# Patient Record
Sex: MALE | Race: WHITE | Employment: UNEMPLOYED | URBAN - METROPOLITAN AREA
[De-identification: names, ages, dates, MRNs, and addresses within clinical notes are randomized per-mention and may not be internally consistent; named-entity substitution may affect disease eponyms.]

---

## 2019-03-08 ENCOUNTER — OFFICE VISIT (OUTPATIENT)
Dept: CARDIOLOGY CLINIC | Age: 69
End: 2019-03-08
Payer: MEDICARE

## 2019-03-08 VITALS
HEART RATE: 69 BPM | BODY MASS INDEX: 29.48 KG/M2 | WEIGHT: 210.6 LBS | HEIGHT: 71 IN | SYSTOLIC BLOOD PRESSURE: 138 MMHG | DIASTOLIC BLOOD PRESSURE: 80 MMHG

## 2019-03-08 DIAGNOSIS — I25.10 CORONARY ARTERY CALCIFICATION SEEN ON CAT SCAN: ICD-10-CM

## 2019-03-08 DIAGNOSIS — I48.19 PERSISTENT ATRIAL FIBRILLATION (HCC): Primary | ICD-10-CM

## 2019-03-08 DIAGNOSIS — Z82.49 FAMILY HISTORY OF HEART DISEASE: ICD-10-CM

## 2019-03-08 DIAGNOSIS — E78.00 ELEVATED LDL CHOLESTEROL LEVEL: ICD-10-CM

## 2019-03-08 PROCEDURE — 99999 PR OFFICE/OUTPT VISIT,PROCEDURE ONLY: CPT | Performed by: INTERNAL MEDICINE

## 2019-03-08 PROCEDURE — 1036F TOBACCO NON-USER: CPT | Performed by: INTERNAL MEDICINE

## 2019-03-08 PROCEDURE — 3017F COLORECTAL CA SCREEN DOC REV: CPT | Performed by: INTERNAL MEDICINE

## 2019-03-08 PROCEDURE — 93000 ELECTROCARDIOGRAM COMPLETE: CPT | Performed by: INTERNAL MEDICINE

## 2019-03-08 PROCEDURE — G8598 ASA/ANTIPLAT THER USED: HCPCS | Performed by: INTERNAL MEDICINE

## 2019-03-08 PROCEDURE — 1123F ACP DISCUSS/DSCN MKR DOCD: CPT | Performed by: INTERNAL MEDICINE

## 2019-03-08 PROCEDURE — G8427 DOCREV CUR MEDS BY ELIG CLIN: HCPCS | Performed by: INTERNAL MEDICINE

## 2019-03-08 PROCEDURE — G8419 CALC BMI OUT NRM PARAM NOF/U: HCPCS | Performed by: INTERNAL MEDICINE

## 2019-03-08 PROCEDURE — 4040F PNEUMOC VAC/ADMIN/RCVD: CPT | Performed by: INTERNAL MEDICINE

## 2019-03-08 PROCEDURE — 1101F PT FALLS ASSESS-DOCD LE1/YR: CPT | Performed by: INTERNAL MEDICINE

## 2019-03-08 PROCEDURE — G8484 FLU IMMUNIZE NO ADMIN: HCPCS | Performed by: INTERNAL MEDICINE

## 2019-03-08 RX ORDER — QUINIDINE GLUCONATE 324 MG
2 TABLET, EXTENDED RELEASE ORAL DAILY
COMMUNITY

## 2019-03-08 RX ORDER — CHLORAL HYDRATE 500 MG
1000 CAPSULE ORAL DAILY
COMMUNITY

## 2019-03-28 ENCOUNTER — HOSPITAL ENCOUNTER (OUTPATIENT)
Dept: NON INVASIVE DIAGNOSTICS | Age: 69
Discharge: HOME | End: 2019-03-28
Payer: MEDICARE

## 2019-03-28 DIAGNOSIS — I25.10 CORONARY ARTERY CALCIFICATION SEEN ON CAT SCAN: ICD-10-CM

## 2019-03-28 DIAGNOSIS — Z82.49 FAMILY HISTORY OF HEART DISEASE: ICD-10-CM

## 2019-03-28 LAB
LV EF: 50 %
LVEF MODALITY: NORMAL

## 2019-03-28 PROCEDURE — 93320 DOPPLER ECHO COMPLETE: CPT

## 2019-03-28 PROCEDURE — 93351 STRESS TTE COMPLETE: CPT

## 2019-04-17 ENCOUNTER — HOSPITAL ENCOUNTER (OUTPATIENT)
Dept: CARDIAC CATH/INVASIVE PROCEDURES | Age: 69
Discharge: HOME | End: 2019-04-17
Attending: INTERNAL MEDICINE | Admitting: INTERNAL MEDICINE
Payer: MEDICARE

## 2019-04-17 VITALS — HEIGHT: 71 IN | WEIGHT: 210 LBS | BODY MASS INDEX: 29.4 KG/M2

## 2019-04-17 PROBLEM — I48.19 PERSISTENT ATRIAL FIBRILLATION (HCC): Status: ACTIVE | Noted: 2019-04-17

## 2019-04-17 LAB
EKG ATRIAL RATE: 69 BPM
EKG DIAGNOSIS: NORMAL
EKG P AXIS: 63 DEGREES
EKG P-R INTERVAL: 190 MS
EKG Q-T INTERVAL: 390 MS
EKG QRS DURATION: 78 MS
EKG QTC CALCULATION (BAZETT): 417 MS
EKG R AXIS: 66 DEGREES
EKG T AXIS: 35 DEGREES
EKG VENTRICULAR RATE: 69 BPM
GFR AFRICAN AMERICAN: >60
GFR NON-AFRICAN AMERICAN: >60
GLUCOSE BLD-MCNC: 99 MG/DL (ref 70–99)
PERFORMED ON: ABNORMAL
POC BUN: 23 MG/DL (ref 7–18)
POC CREATININE: 0.9 MG/DL (ref 0.8–1.3)
POC HEMATOCRIT: 40 % (ref 40.5–52.5)
POC POTASSIUM: 4.8 MMOL/L (ref 3.5–5.1)
POC SAMPLE TYPE: ABNORMAL
POC SODIUM: 141 MMOL/L (ref 136–145)

## 2019-04-17 PROCEDURE — 2500000003 HC RX 250 WO HCPCS

## 2019-04-17 PROCEDURE — 84295 ASSAY OF SERUM SODIUM: CPT

## 2019-04-17 PROCEDURE — 82947 ASSAY GLUCOSE BLOOD QUANT: CPT

## 2019-04-17 PROCEDURE — 92960 CARDIOVERSION ELECTRIC EXT: CPT

## 2019-04-17 PROCEDURE — 84520 ASSAY OF UREA NITROGEN: CPT

## 2019-04-17 PROCEDURE — 93010 ELECTROCARDIOGRAM REPORT: CPT | Performed by: INTERNAL MEDICINE

## 2019-04-17 PROCEDURE — 85014 HEMATOCRIT: CPT

## 2019-04-17 PROCEDURE — 82565 ASSAY OF CREATININE: CPT

## 2019-04-17 PROCEDURE — 7100000010 HC PHASE II RECOVERY - FIRST 15 MIN

## 2019-04-17 PROCEDURE — 93005 ELECTROCARDIOGRAM TRACING: CPT | Performed by: INTERNAL MEDICINE

## 2019-04-17 PROCEDURE — 84132 ASSAY OF SERUM POTASSIUM: CPT

## 2019-04-17 PROCEDURE — 99999 PR OFFICE/OUTPT VISIT,PROCEDURE ONLY: CPT | Performed by: INTERNAL MEDICINE

## 2019-04-17 PROCEDURE — 92960 CARDIOVERSION ELECTRIC EXT: CPT | Performed by: INTERNAL MEDICINE

## 2019-04-17 RX ORDER — METOPROLOL SUCCINATE 25 MG/1
25 TABLET, EXTENDED RELEASE ORAL DAILY
Qty: 30 TABLET | Refills: 3 | Status: SHIPPED | OUTPATIENT
Start: 2019-04-17 | End: 2019-05-22 | Stop reason: SDUPTHER

## 2019-04-17 NOTE — PROCEDURES
[address]                     [address] [facility], [facility]                            CARDIAC CATHETERIZATION    PATIENT NAME: Mainor Weeks                          :        1950  MED REC NO:   [id number]                          ROOM:  ACCOUNT NO:   [de-identified]                           ADMIT DATE: 2019  PROVIDER:     Caity Moncada MD    DATE OF PROCEDURE:  2019    PROCEDURE PERFORMED:  Elective cardioversion. INDICATION FOR PROCEDURE:  The patient with new onset atrial  fibrillation, now was on Eliquis therapy. SURGEON:  Caity Moncada MD.    DESCRIPTION OF PROCEDURE:  After informed consent was obtained from the  patient, he was given 80 mg of intravenous Bromatol for full conscious  sedation. All vital signs were monitored including post oximeter. He  was then cardioverted using 200 joules to a normal sinus rhythm without  any apparent complications. He will be discharged on Eliquis 5 mg  b.i.d. He will continue and also will be on Toprol-XL 25 mg daily. We  will see him in follow up in one month's time. Also, since his atrial  fibrillation is relatively asymptomatic, we will plan on event recorder  at his follow up visit provided he maintains normal sinus rhythm that  will help us to decide if he should stay on Eliquis or not and also  consider other treatment options.         Tiny Fried MD    D: 2019 10:06:35       T: 2019 10:14:57     [email or web address]  Job#: [id number]     Doc#: [id number]    CC:

## 2019-04-17 NOTE — H&P
Yes Skyla Cool MD      Allergies:  No allergies     Review of Systems:   · Constitutional: there has been no unanticipated weight loss. There's been no change in energy level, sleep pattern, or activity level. · Eyes: No visual changes or diplopia. No scleral icterus. · ENT: No Headaches, hearing loss or vertigo. No mouth sores or sore throat. · Cardiovascular: Reviewed in HPI  · Respiratory: No cough or wheezing, no sputum production. No hematemesis. · Gastrointestinal: No abdominal pain, appetite loss, blood in stools. No change in bowel or bladder habits. · Genitourinary: No dysuria, trouble voiding, or hematuria. · Musculoskeletal:  No gait disturbance, weakness or joint complaints. Hip, cervical, back issues. · Integumentary: No rash or pruritis. · Neurological: No headache, diplopia, change in muscle strength, numbness or tingling. No change in gait, balance, coordination, mood, affect, memory, mentation, behavior. · Psychiatric: No anxiety, no depression. · Endocrine: No malaise, fatigue or temperature intolerance. No excessive thirst, fluid intake, or urination. No tremor. · Hematologic/Lymphatic: No abnormal bruising or bleeding, blood clots or swollen lymph nodes. · Allergic/Immunologic: No nasal congestion or hives. Physical Examination:    There were no vitals filed for this visit.      Wt Readings from Last 1 Encounters:   04/17/19 210 lb (95.3 kg)     Skin:good turgor,intact without lesions  HEENT: EOMI ,normal  Neck:no JVD       Respiratory:  · Normal excursion and expansion without use of accessory muscles  · Resp Auscultation: Normal breath sounds without dullness  Cardiovascular:  · The apical impulses not displaced  · Heart tones are crisp and normal,irregular rhythm  · Cervical veins are not engorged  · The carotid upstroke is normal in amplitude and contour without delay or bruit  · Peripheral pulses are symmetrical and full  · There is no clubbing, cyanosis of the extremities. · No edema  · Femoral Arteries: 2+ and equal  · Pedal Pulses: 2+ and equal   Abdomen:  · No masses or tenderness  · Liver/Spleen: No Abnormalities Noted  Neurological/Psychiatric:  · Alert and oriented in all spheres  · Moves all extremities well  · Exhibits normal gait balance and coordination  · No abnormalities of mood, affect, memory, mentation, or behavior are noted    CT lung screening 8/1/18  1. Category 2, benign appearance or behavior. However due to the groundglass appearance of the right upper lobe density, follow-up in 6 months is recommended to confirm persistence     2. Category S, positive, coronary artery calcifications. Clinical correlation recommended    Assessment:    Family history of cardiovascular disease  2018 Lung scan showed coronary artery calcifications   He takes a baby aspirin. Lipid panel 2/1/18> , , HDL 41,  -- not on statin. 2/1/18> Glucose 105.   2015> A1c 5.8. Atrial fibrillation, new onset  Noted on EKG today, rate 69. He cannot tell he is in it. No old EKG's to compare. Stop aspirin. Begin taking Eliquis 5mg bid (samples & Rx given). Plan:  Stress echo normal  Reviewed his lab work from 2/2018. Slip given for bmp, liver enzymes, lipid panel, cbc in 2 months. Stop baby aspirin, begin Eliquis 5mg bid. Also needs to stop taking NSAIDS, ok to take Tylenol. He will work on diet modifications to reduce his LDL (watch carbs/sugars). Cardioversion today    I appreciate the opportunity of cooperating in the care of this individual.    Marjorie Rhodes.  Phyllis Cottrell M.D., [facility] [place]

## 2019-05-22 ENCOUNTER — OFFICE VISIT (OUTPATIENT)
Dept: CARDIOLOGY CLINIC | Age: 69
End: 2019-05-22
Payer: MEDICARE

## 2019-05-22 VITALS
SYSTOLIC BLOOD PRESSURE: 130 MMHG | DIASTOLIC BLOOD PRESSURE: 80 MMHG | BODY MASS INDEX: 29.12 KG/M2 | HEART RATE: 65 BPM | HEIGHT: 71 IN | WEIGHT: 208 LBS | OXYGEN SATURATION: 95 %

## 2019-05-22 DIAGNOSIS — I34.0 MITRAL VALVE INSUFFICIENCY, UNSPECIFIED ETIOLOGY: ICD-10-CM

## 2019-05-22 DIAGNOSIS — I48.19 PERSISTENT ATRIAL FIBRILLATION (HCC): Primary | ICD-10-CM

## 2019-05-22 DIAGNOSIS — E78.00 ELEVATED LDL CHOLESTEROL LEVEL: ICD-10-CM

## 2019-05-22 PROCEDURE — 3017F COLORECTAL CA SCREEN DOC REV: CPT | Performed by: NURSE PRACTITIONER

## 2019-05-22 PROCEDURE — 93000 ELECTROCARDIOGRAM COMPLETE: CPT | Performed by: NURSE PRACTITIONER

## 2019-05-22 PROCEDURE — G8419 CALC BMI OUT NRM PARAM NOF/U: HCPCS | Performed by: NURSE PRACTITIONER

## 2019-05-22 PROCEDURE — 99214 OFFICE O/P EST MOD 30 MIN: CPT | Performed by: NURSE PRACTITIONER

## 2019-05-22 PROCEDURE — G8427 DOCREV CUR MEDS BY ELIG CLIN: HCPCS | Performed by: NURSE PRACTITIONER

## 2019-05-22 PROCEDURE — 4040F PNEUMOC VAC/ADMIN/RCVD: CPT | Performed by: NURSE PRACTITIONER

## 2019-05-22 PROCEDURE — G8598 ASA/ANTIPLAT THER USED: HCPCS | Performed by: NURSE PRACTITIONER

## 2019-05-22 PROCEDURE — 1123F ACP DISCUSS/DSCN MKR DOCD: CPT | Performed by: NURSE PRACTITIONER

## 2019-05-22 PROCEDURE — 1036F TOBACCO NON-USER: CPT | Performed by: NURSE PRACTITIONER

## 2019-05-22 RX ORDER — METOPROLOL SUCCINATE 25 MG/1
25 TABLET, EXTENDED RELEASE ORAL DAILY
Qty: 30 TABLET | Refills: 3 | Status: SHIPPED | OUTPATIENT
Start: 2019-05-22 | End: 2019-06-20 | Stop reason: SDUPTHER

## 2019-05-22 NOTE — PROGRESS NOTES
[address]     Outpatient Follow Up Note    Shivam Evans is 71 y.o. male who presents today with a history of cor calcifications on lung scan Aug '18, rheumatic fever and AF    Interval hx: 4/17/19: DCCV    CHIEF COMPLAINT / HPI:  Follow Up secondary to AF. He never had any symptoms. However, when hiking and going up hills he'd become more SOB than normal. Every once in a while he could hear his heart pounding in his ears. Subjective:   he denies significant chest pain. There is no SOB/MACHADO. He hadn't hike but played tennis since his procedure. The patient denies orthopnea/PND. The patient does not have swelling. The patients weight is stable. The patient is not experiencing palpitations or dizziness. These symptoms are improving since the last OV. With regard to medication therapy the patient has been compliant with prescribed regimen. They have tolerated therapy to date. Past Medical History:   Diagnosis Date    Rheumatic fever     as a kid     Social History:    Social History     Tobacco Use   Smoking Status Former Smoker   Smokeless Tobacco Never Used     Current Medications:  Current Outpatient Medications   Medication Sig Dispense Refill    metoprolol succinate (TOPROL XL) 25 MG extended release tablet Take 1 tablet by mouth daily 30 tablet 3    Multiple Vitamins-Minerals (MULTIVITAMIN PO) Take 0.5 tablets by mouth daily      Glucosamine-Chondroit-Vit C-Mn (GLUCOSAMINE-CHONDROITIN) TABS Take 2 tablets by mouth daily      Calcium Carb-Cholecalciferol (CALCIUM+D3 PO) Take by mouth every other day      Omega-3 Fatty Acids (FISH OIL) 1000 MG CAPS Take 500 mg by mouth daily      apixaban (ELIQUIS) 5 MG TABS tablet Take 1 tablet by mouth 2 times daily 180 tablet 3     No current facility-administered medications for this visit. REVIEW OF SYSTEMS:    CONSTITUTIONAL: No major weight gain or loss, fatigue, weakness, night sweats or fever.   HEENT: No new vision difficulties or ringing in

## 2019-05-24 ENCOUNTER — TELEPHONE (OUTPATIENT)
Dept: CARDIOLOGY CLINIC | Age: 69
End: 2019-05-24

## 2019-05-28 NOTE — TELEPHONE ENCOUNTER
Left another message to call us back in regards to lab - sent notification through my chart with results since pt is unreachable through phone

## 2019-05-30 DIAGNOSIS — I34.0 MITRAL VALVE INSUFFICIENCY, UNSPECIFIED ETIOLOGY: ICD-10-CM

## 2019-05-30 DIAGNOSIS — I25.10 CORONARY ARTERY CALCIFICATION SEEN ON CAT SCAN: ICD-10-CM

## 2019-05-30 DIAGNOSIS — E78.00 ELEVATED LDL CHOLESTEROL LEVEL: Primary | ICD-10-CM

## 2019-05-30 DIAGNOSIS — I10 ESSENTIAL HYPERTENSION: ICD-10-CM

## 2019-05-30 NOTE — TELEPHONE ENCOUNTER
Received labs from [facility]-  Per NPTS pt is to start Crestor 20 mg and recheck lipid panel and LFT in 8 weeks.     Pended medication and labs for signature will forward to NPTS once pt has contacted us back     Tried to notify pt but no answer - left a message on recorder explaining to give us a call back in regards to labs and plan of action

## 2019-05-31 RX ORDER — ROSUVASTATIN CALCIUM 20 MG/1
20 TABLET, COATED ORAL DAILY
Qty: 30 TABLET | Refills: 5 | Status: SHIPPED | OUTPATIENT
Start: 2019-05-31 | End: 2019-09-25 | Stop reason: SDUPTHER

## 2019-06-21 NOTE — TELEPHONE ENCOUNTER
RX APPROVAL:      Refill:   Requested Prescriptions     Pending Prescriptions Disp Refills    metoprolol succinate (TOPROL XL) 25 MG extended release tablet [Pharmacy Med Name: METOPROLOL SUCC ER 25 MG TAB] 30 tablet 1     Sig: TAKE 1 TABLET BY MOUTH EVERY DAY      Last OV: 2019   Last EK19  Last Labs: 19  Lab Results   Component Value Date    CREATININE 0.9 2019    LABGLOM >60 2019     Lab Results   Component Value Date    CREATININE 0.9 2019    LABGLOM >60 2019    GFRAA >60 2019

## 2019-06-23 RX ORDER — METOPROLOL SUCCINATE 25 MG/1
TABLET, EXTENDED RELEASE ORAL
Qty: 90 TABLET | Refills: 3 | Status: SHIPPED | OUTPATIENT
Start: 2019-06-23 | End: 2020-08-17

## 2019-08-19 ENCOUNTER — TELEPHONE (OUTPATIENT)
Dept: CARDIOLOGY CLINIC | Age: 69
End: 2019-08-19

## 2019-08-19 NOTE — TELEPHONE ENCOUNTER
CARDIAC CLEARANCE     What type of procedure are you having? Hip     Which physician is performing your procedure? [facility]     When is your procedure scheduled for?  10/24/19    Where are you having this procedure? [facility]    Are you taking Blood Thinners?  eliquis    If so what? (Name/dose/frequesncy)     Does the surgeon want you to stop your blood thinner? If so for how long?   didn't say   Phone Number and Contact Name for Physicians office:    Fax number to send information:    Call to let him know if he is cleared or if he needs appt .  Just saw NPTS 5/2019

## 2019-10-01 ENCOUNTER — TELEPHONE (OUTPATIENT)
Dept: CARDIOLOGY CLINIC | Age: 69
End: 2019-10-01

## 2019-10-10 PROBLEM — I34.0 MITRAL VALVE INSUFFICIENCY: Status: ACTIVE | Noted: 2019-10-10

## 2019-10-10 PROBLEM — Z01.810 ENCOUNTER FOR PRE-OPERATIVE CARDIOVASCULAR CLEARANCE: Status: ACTIVE | Noted: 2019-10-10

## 2019-11-09 PROBLEM — Z01.810 ENCOUNTER FOR PRE-OPERATIVE CARDIOVASCULAR CLEARANCE: Status: RESOLVED | Noted: 2019-10-10 | Resolved: 2019-11-09

## 2020-08-17 RX ORDER — METOPROLOL SUCCINATE 25 MG/1
TABLET, EXTENDED RELEASE ORAL
Qty: 90 TABLET | Refills: 0 | Status: SHIPPED | OUTPATIENT
Start: 2020-08-17 | End: 2021-01-05 | Stop reason: SDUPTHER

## 2020-12-05 RX ORDER — METOPROLOL SUCCINATE 25 MG/1
TABLET, EXTENDED RELEASE ORAL
Qty: 90 TABLET | Refills: 0 | OUTPATIENT
Start: 2020-12-05

## 2020-12-05 NOTE — TELEPHONE ENCOUNTER
Received refill request for Metoprolol from [facility].      Last OV: 05/22/2019 w/ NPTS ; 03/08/2019 w/ LES    Last Refill: 08/17/2020 #90 w/ 0 refills    Next Appointment: none f/u appts have been cancelled

## 2021-02-08 RX ORDER — METOPROLOL SUCCINATE 25 MG/1
TABLET, EXTENDED RELEASE ORAL
Qty: 30 TABLET | Refills: 0 | OUTPATIENT
Start: 2021-02-08

## 2021-02-11 RX ORDER — METOPROLOL SUCCINATE 25 MG/1
TABLET, EXTENDED RELEASE ORAL
Qty: 30 TABLET | Refills: 0 | Status: SHIPPED | OUTPATIENT
Start: 2021-02-11

## 2021-02-11 NOTE — TELEPHONE ENCOUNTER
Received refill request for metoprolol from [facility].     Last ov:5/22/2019 NPTS    Last labs:5/2019 cmp    Last Refill:1/11/2021 #30 with 0 refills    Next appointment:no future appt scheduled    NPTS is OOT